# Patient Record
Sex: FEMALE | URBAN - METROPOLITAN AREA
[De-identification: names, ages, dates, MRNs, and addresses within clinical notes are randomized per-mention and may not be internally consistent; named-entity substitution may affect disease eponyms.]

---

## 2023-12-04 ENCOUNTER — OFFICE VISIT (OUTPATIENT)
Age: 23
End: 2023-12-04

## 2023-12-04 VITALS
OXYGEN SATURATION: 97 % | WEIGHT: 121.6 LBS | HEART RATE: 70 BPM | TEMPERATURE: 97.8 F | DIASTOLIC BLOOD PRESSURE: 79 MMHG | SYSTOLIC BLOOD PRESSURE: 119 MMHG | RESPIRATION RATE: 20 BRPM | HEIGHT: 65 IN | BODY MASS INDEX: 20.26 KG/M2

## 2023-12-04 DIAGNOSIS — J20.9 ACUTE BRONCHITIS, UNSPECIFIED ORGANISM: Primary | ICD-10-CM

## 2023-12-04 RX ORDER — DEXTROMETHORPHAN HYDROBROMIDE AND PROMETHAZINE HYDROCHLORIDE 15; 6.25 MG/5ML; MG/5ML
5 SYRUP ORAL 4 TIMES DAILY PRN
Qty: 120 ML | Refills: 0 | Status: SHIPPED | OUTPATIENT
Start: 2023-12-04 | End: 2023-12-11

## 2023-12-04 RX ORDER — PREDNISONE 20 MG/1
20 TABLET ORAL 2 TIMES DAILY
Qty: 10 TABLET | Refills: 0 | Status: SHIPPED | OUTPATIENT
Start: 2023-12-04 | End: 2023-12-09

## 2023-12-04 RX ORDER — BUPROPION HYDROCHLORIDE 75 MG/1
TABLET ORAL
COMMUNITY
Start: 2023-11-16

## 2023-12-04 RX ORDER — AZITHROMYCIN 250 MG/1
250 TABLET, FILM COATED ORAL SEE ADMIN INSTRUCTIONS
Qty: 6 TABLET | Refills: 0 | Status: SHIPPED | OUTPATIENT
Start: 2023-12-04 | End: 2023-12-09

## 2023-12-04 RX ORDER — LEVONORGESTREL / ETHINYL ESTRADIOL 0.15-0.03
KIT ORAL
COMMUNITY
Start: 2023-11-16

## 2023-12-04 ASSESSMENT — ENCOUNTER SYMPTOMS
SINUS PRESSURE: 1
SHORTNESS OF BREATH: 0
WHEEZING: 1
HEARTBURN: 0
RHINORRHEA: 0
SORE THROAT: 0
COUGH: 1
HEMOPTYSIS: 0

## 2024-03-11 ENCOUNTER — OFFICE VISIT (OUTPATIENT)
Age: 24
End: 2024-03-11

## 2024-03-11 VITALS
TEMPERATURE: 98.3 F | WEIGHT: 120.2 LBS | HEART RATE: 91 BPM | OXYGEN SATURATION: 98 % | DIASTOLIC BLOOD PRESSURE: 78 MMHG | SYSTOLIC BLOOD PRESSURE: 123 MMHG | BODY MASS INDEX: 20 KG/M2

## 2024-03-11 DIAGNOSIS — J40 BRONCHITIS: Primary | ICD-10-CM

## 2024-03-11 RX ORDER — AZITHROMYCIN 250 MG/1
TABLET, FILM COATED ORAL
Qty: 6 TABLET | Refills: 0 | Status: SHIPPED | OUTPATIENT
Start: 2024-03-11 | End: 2024-03-21

## 2024-03-11 RX ORDER — ALBUTEROL SULFATE 90 UG/1
2 AEROSOL, METERED RESPIRATORY (INHALATION) 4 TIMES DAILY PRN
Qty: 18 G | Refills: 0 | Status: SHIPPED | OUTPATIENT
Start: 2024-03-11

## 2024-03-11 RX ORDER — PREDNISONE 20 MG/1
20 TABLET ORAL 2 TIMES DAILY
Qty: 10 TABLET | Refills: 0 | Status: SHIPPED | OUTPATIENT
Start: 2024-03-11 | End: 2024-03-16

## 2024-03-12 NOTE — PROGRESS NOTES
Sana Mugnuia (:  2000) is a 23 y.o. female,Established patient, here for evaluation of the following chief complaint(s):  Cough (Congestion, coughing x 3 weeks, sob and green mucus x 3 days)      ASSESSMENT/PLAN:  Visit Diagnoses and Associated Orders       Bronchitis    -  Primary    azithromycin (ZITHROMAX) 250 MG tablet [50274]      predniSONE (DELTASONE) 20 MG tablet [[id number]]      Dextromethorphan-guaiFENesin  MG/5ML SYRP [564018]      albuterol sulfate HFA (VENTOLIN HFA) 108 (90 Base) MCG/ACT inhaler [[id number]]                 Increase fluids (preferably with electrolytes) and rest.  Emergency follow up required for symptoms including, but not limited to, shortness of breath, chest pain, mental status change, fevers >101, difficulty or inability to swallow, dehydration, or if symptoms worsen.  See printed instructions given at discharge.     SUBJECTIVE/OBJECTIVE:  C/o productive Cough, nasal and chest Congestion, and BA x 3 weeks, Also w/ c/o sob and green mucus x 3 days  Rhonchi and wheezes noted as well as decreased air flow.        History provided by:  Patient    HPI:   23 y.o. female presents with symptoms of: Bacterial bronchitis         Vitals:    24 1947   BP: 123/78   Site: Right Upper Arm   Position: Sitting   Cuff Size: Medium Adult   Pulse: 91   Temp: 98.3 °F (36.8 °C)   TempSrc: Oral   SpO2: 98%   Weight: 54.5 kg (120 lb 3.2 oz)         Physical Exam  Constitutional:       General: She is not in acute distress.     Appearance: Normal appearance. She is well-developed.   HENT:      Right Ear: External ear normal. No tenderness. Tympanic membrane is bulging. Tympanic membrane is not erythematous.      Left Ear: External ear normal. No tenderness. Tympanic membrane is bulging. Tympanic membrane is not erythematous.      Nose: Congestion and rhinorrhea present.      Right Sinus: No maxillary sinus tenderness or frontal sinus tenderness.      Left Sinus: No maxillary sinus tenderness

## 2024-03-12 NOTE — PROGRESS NOTES
Sana Munguia (:  2000) is a 23 y.o. female,Established patient, here for evaluation of the following chief complaint(s):  Cough (Congestion, coughing x 3 weeks, sob and green mucus x 3 days)      ASSESSMENT/PLAN:  Visit Diagnoses and Associated Orders       Bronchitis    -  Primary    azithromycin (ZITHROMAX) 250 MG tablet [[id number]]      predniSONE (DELTASONE) 20 MG tablet [6496]      Dextromethorphan-guaiFENesin  MG/5ML SYRP [[id number]]      albuterol sulfate HFA (VENTOLIN HFA) 108 (90 Base) MCG/ACT inhaler [19781]                 Increase fluids (preferably with electrolytes) and rest.  Emergency follow up required for symptoms including, but not limited to, shortness of breath, chest pain, mental status change, fevers >101, difficulty or inability to swallow, dehydration, or if symptoms worsen.  See printed instructions given at discharge.     SUBJECTIVE/OBJECTIVE:  HPI  HPI:   23 y.o. female presents with symptoms of {HEENT CC:[id number]}         Vitals:    24 1947   BP: 123/78   Site: Right Upper Arm   Position: Sitting   Cuff Size: Medium Adult   Pulse: 91   Temp: 98.3 °F (36.8 °C)   TempSrc: Oral   SpO2: 98%   Weight: 54.5 kg (120 lb 3.2 oz)         Physical Exam      An electronic signature was used to authenticate this note.    --STANTON Sol - CNP

## 2024-06-18 ENCOUNTER — OFFICE VISIT (OUTPATIENT)
Age: 24
End: 2024-06-18

## 2024-06-18 VITALS
OXYGEN SATURATION: 97 % | BODY MASS INDEX: 18.92 KG/M2 | SYSTOLIC BLOOD PRESSURE: 118 MMHG | HEART RATE: 72 BPM | HEIGHT: 64 IN | TEMPERATURE: 98.2 F | RESPIRATION RATE: 16 BRPM | DIASTOLIC BLOOD PRESSURE: 77 MMHG | WEIGHT: 110.8 LBS

## 2024-06-18 DIAGNOSIS — H10.31 ACUTE BACTERIAL CONJUNCTIVITIS OF RIGHT EYE: Primary | ICD-10-CM

## 2024-06-18 RX ORDER — PREDNISONE 20 MG/1
20 TABLET ORAL 2 TIMES DAILY
Qty: 10 TABLET | Refills: 0 | Status: SHIPPED | OUTPATIENT
Start: 2024-06-18 | End: 2024-06-23

## 2024-06-18 RX ORDER — TOBRAMYCIN 3 MG/ML
1 SOLUTION/ DROPS OPHTHALMIC EVERY 4 HOURS
Qty: 5 ML | Refills: 0 | Status: SHIPPED | OUTPATIENT
Start: 2024-06-18 | End: 2024-06-25

## 2024-06-18 ASSESSMENT — ENCOUNTER SYMPTOMS
EYE PAIN: 0
RHINORRHEA: 0
EYE REDNESS: 1
SWOLLEN GLANDS: 0
EYE ITCHING: 1
SORE THROAT: 0
EYE DISCHARGE: 1

## 2024-06-18 NOTE — PROGRESS NOTES
No exudate or hemorrhage.     Left eye: Left conjunctiva is not injected.      Pupils: Pupils are equal, round, and reactive to light.      Comments: Pt has contact lenses in her eyes   Cardiovascular:      Rate and Rhythm: Normal rate and regular rhythm.   Pulmonary:      Effort: Pulmonary effort is normal. No respiratory distress.      Breath sounds: Normal breath sounds.   Musculoskeletal:      Cervical back: Neck supple. No rigidity.   Lymphadenopathy:      Cervical: No cervical adenopathy.   Skin:     Findings: No rash.   Neurological:      General: No focal deficit present.      Mental Status: She is alert and oriented to person, place, and time.           An electronic signature was used to authenticate this note.    --KVNG ELDRIDGE MD

## 2025-05-24 ENCOUNTER — OFFICE VISIT (OUTPATIENT)
Age: 25
End: 2025-05-24

## 2025-05-24 VITALS
SYSTOLIC BLOOD PRESSURE: 119 MMHG | WEIGHT: 106.6 LBS | DIASTOLIC BLOOD PRESSURE: 76 MMHG | BODY MASS INDEX: 16.73 KG/M2 | OXYGEN SATURATION: 97 % | HEIGHT: 67 IN | TEMPERATURE: 97.8 F | HEART RATE: 89 BPM

## 2025-05-24 DIAGNOSIS — R06.02 SHORTNESS OF BREATH: Primary | ICD-10-CM

## 2025-05-24 DIAGNOSIS — F41.9 ANXIETY: ICD-10-CM

## 2025-05-24 RX ORDER — HYDROXYZINE HYDROCHLORIDE 25 MG/1
25 TABLET, FILM COATED ORAL EVERY 8 HOURS PRN
Qty: 30 TABLET | Refills: 0 | Status: SHIPPED | OUTPATIENT
Start: 2025-05-24 | End: 2025-06-03

## 2025-05-24 ASSESSMENT — ENCOUNTER SYMPTOMS: SHORTNESS OF BREATH: 1

## 2025-05-24 NOTE — PROGRESS NOTES
after doing something and then sitting down.  Reports it feels as if there is just pressure on her chest.  Does not endorse and some anxiety and occasionally palpitations.  Denies any significant heart history.  Mentions her father had a heart attack in his 50s has chronic heart failure,  Hypertension.         Vitals:    05/24/25 1856   BP: 119/76   BP Site: Left Upper Arm   Patient Position: Sitting   BP Cuff Size: Large Adult   Pulse: 89   Temp: 97.8 °F (36.6 °C)   TempSrc: Oral   SpO2: 97%   Weight: 48.4 kg (106 lb 9.6 oz)   Height: 1.702 m (5' 7\")       No results found for this visit on 05/24/25.      Objective   Physical Exam  Vitals and nursing note reviewed.   Constitutional:       General: She is not in acute distress.     Appearance: Normal appearance. She is well-developed. She is not ill-appearing, toxic-appearing or diaphoretic.   HENT:      Head: Normocephalic and atraumatic.      Right Ear: Hearing normal. No mastoid tenderness. No hemotympanum. Tympanic membrane is not perforated, erythematous or bulging.      Left Ear: Hearing normal. No mastoid tenderness. No hemotympanum. Tympanic membrane is not perforated, erythematous or bulging.      Nose: Nose normal.      Mouth/Throat:      Mouth: Mucous membranes are moist.      Pharynx: Oropharynx is clear. Uvula midline.      Tonsils: No tonsillar abscesses.   Eyes:      Conjunctiva/sclera:      Right eye: Right conjunctiva is not injected. No hemorrhage.     Left eye: Left conjunctiva is not injected. No hemorrhage.     Pupils: Pupils are equal, round, and reactive to light.   Neck:      Thyroid: No thyromegaly.      Trachea: Trachea normal.   Cardiovascular:      Rate and Rhythm: Normal rate and regular rhythm. No extrasystoles are present.     Chest Wall: PMI is not displaced.      Heart sounds: Normal heart sounds. No murmur heard.     No friction rub. No gallop.   Pulmonary:      Effort: Pulmonary effort is normal. No respiratory distress.

## 2025-07-23 ENCOUNTER — OFFICE VISIT (OUTPATIENT)
Age: 25
End: 2025-07-23

## 2025-07-23 VITALS
BODY MASS INDEX: 17.07 KG/M2 | HEART RATE: 74 BPM | SYSTOLIC BLOOD PRESSURE: 121 MMHG | DIASTOLIC BLOOD PRESSURE: 74 MMHG | WEIGHT: 109 LBS | OXYGEN SATURATION: 97 %

## 2025-07-23 DIAGNOSIS — K04.7 DENTAL ABSCESS: ICD-10-CM

## 2025-07-23 DIAGNOSIS — K08.89 PAIN, DENTAL: Primary | ICD-10-CM

## 2025-07-23 RX ORDER — IBUPROFEN 600 MG/1
600 TABLET, FILM COATED ORAL 4 TIMES DAILY PRN
Qty: 40 TABLET | Refills: 0 | Status: SHIPPED | OUTPATIENT
Start: 2025-07-23

## 2025-07-23 RX ORDER — AMOXICILLIN 500 MG/1
500 CAPSULE ORAL 2 TIMES DAILY
Qty: 20 CAPSULE | Refills: 0 | Status: SHIPPED | OUTPATIENT
Start: 2025-07-23 | End: 2025-08-02

## 2025-07-23 RX ORDER — LIDOCAINE HYDROCHLORIDE 20 MG/ML
15 SOLUTION OROPHARYNGEAL
Qty: 100 ML | Refills: 0 | Status: SHIPPED | OUTPATIENT
Start: 2025-07-23

## 2025-07-23 NOTE — PROGRESS NOTES
Sana Munguia (:  2000) is a 25 y.o. female,Established patient, here for evaluation of the following chief complaint(s):  Ear Pain (Left side) and Jaw Pain (Left side, x 3 days ago )      Assessment & Plan :  Visit Diagnoses and Associated Orders         Pain, dental    -  Primary    lidocaine viscous hcl (XYLOCAINE) 2 % SOLN solution [31466]      ibuprofen (ADVIL;MOTRIN) 600 MG tablet [[id number]]             Dental abscess        amoxicillin (AMOXIL) 500 MG capsule [451]                   Patient was seen and evaluate for dental pain. Assessment dental pain related to dental abscess due to tooth fracture.  Prescribed amoxicillin twice daily for 10 days.  Instructed to Take this medication until completed. For pain recommend rotating 650 mg of Tylenol and 600 mg ibuprofen every 6 hours.  This will give you something every 3 hours for pain.  In addition prescribed viscous lidocaine swish and spit 15 mL by mouth every 3 hours. Instructed  may ice the outer face. Recommend sticking to soft foods that are easy to chew. Instructed on the importance of Good oral hygiene. Follow-up with your Dentist in 7-10 day if symptoms worsen or fail to improve.  Instructed If pain uncontrolled by above evaluation in the emergency department as needed. Patient verbalized and agreed to plan of care.          Subjective :  HPI     25 y.o. female presents with symptoms of left ear pain and jaw pain. Reports onset of symptoms 3 days ago. Reports the pain is located in left ear or left tooth. Reports, the pain radiates down jaw. Denies any ear drainage. Report she has a cracked tooth but has been unable to have it removed due to cost.          Vitals:    25 1402   BP: 121/74   Pulse: 74   SpO2: 97%   Weight: 49.4 kg (109 lb)       No results found for this visit on 25.      Objective   Physical Exam  Vitals and nursing note reviewed.   Constitutional:       General: She is not in acute distress.     Appearance: Normal

## 2025-07-23 NOTE — PATIENT INSTRUCTIONS
Prescribed amoxicillin twice daily for 10 days.  Take this medication until completed.    For pain recommend rotating 650 mg of Tylenol and 600 mg ibuprofen every 6 hours.  This will give you something every 3 hours for pain.  In addition prescribed viscous lidocaine swish and spit 15 mL by mouth every 3 hours.    You may ice the outer face.    Recommend sticking to soft foods that are easy to chew.    Good oral hygiene.    Follow-up with your primary care provider in 3 to 5 days if symptoms worsen or fail to improve.  Provided dental resources for you to contact and schedule an appointment soon as possible.    If pain uncontrolled by above evaluation in the emergency department as needed.